# Patient Record
Sex: MALE | Race: WHITE | NOT HISPANIC OR LATINO | Employment: OTHER | URBAN - METROPOLITAN AREA
[De-identification: names, ages, dates, MRNs, and addresses within clinical notes are randomized per-mention and may not be internally consistent; named-entity substitution may affect disease eponyms.]

---

## 2019-07-01 ENCOUNTER — EST. PATIENT EMERGENCY (OUTPATIENT)
Dept: URBAN - METROPOLITAN AREA CLINIC 38 | Facility: CLINIC | Age: 79
End: 2019-07-01

## 2019-07-01 DIAGNOSIS — H02.886: ICD-10-CM

## 2019-07-01 DIAGNOSIS — H02.883: ICD-10-CM

## 2019-07-01 DIAGNOSIS — H01.115: ICD-10-CM

## 2019-07-01 DIAGNOSIS — H01.111: ICD-10-CM

## 2019-07-01 DIAGNOSIS — H04.123: ICD-10-CM

## 2019-07-01 DIAGNOSIS — H01.112: ICD-10-CM

## 2019-07-01 DIAGNOSIS — H01.114: ICD-10-CM

## 2019-07-01 PROCEDURE — 92012 INTRM OPH EXAM EST PATIENT: CPT

## 2019-07-01 ASSESSMENT — TONOMETRY
OS_IOP_MMHG: 13
OD_IOP_MMHG: 12

## 2019-07-01 ASSESSMENT — VISUAL ACUITY
OD_SC: 20/70
OS_SC: 20/20-1
OU_SC: 20/20
OD_SC: J1
OS_SC: J12
OU_SC: J1

## 2022-03-31 NOTE — PATIENT DISCUSSION
Updated glasses Rx provided. No changes to prism. Minimal need to update at this time unless patient wishes to switch to progressives.